# Patient Record
Sex: FEMALE | Race: AMERICAN INDIAN OR ALASKA NATIVE
[De-identification: names, ages, dates, MRNs, and addresses within clinical notes are randomized per-mention and may not be internally consistent; named-entity substitution may affect disease eponyms.]

---

## 2018-04-21 ENCOUNTER — HOSPITAL ENCOUNTER (EMERGENCY)
Dept: HOSPITAL 5 - ED | Age: 43
LOS: 5 days | Discharge: PSYCHIATRIC OR SUBSTANCE USE FACILITY | End: 2018-04-26
Payer: SELF-PAY

## 2018-04-21 DIAGNOSIS — Z79.899: ICD-10-CM

## 2018-04-21 DIAGNOSIS — F22: ICD-10-CM

## 2018-04-21 DIAGNOSIS — F17.200: ICD-10-CM

## 2018-04-21 DIAGNOSIS — Z98.51: ICD-10-CM

## 2018-04-21 DIAGNOSIS — F12.10: ICD-10-CM

## 2018-04-21 DIAGNOSIS — F29: Primary | ICD-10-CM

## 2018-04-21 LAB
ALBUMIN SERPL-MCNC: 4.4 G/DL (ref 3.9–5)
ALT SERPL-CCNC: 14 UNITS/L (ref 7–56)
BASOPHILS # (AUTO): 0.1 K/MM3 (ref 0–0.1)
BASOPHILS NFR BLD AUTO: 0.9 % (ref 0–1.8)
BENZODIAZEPINES SCREEN,URINE: (no result)
BILIRUB UR QL STRIP: (no result)
BLOOD UR QL VISUAL: (no result)
BUN SERPL-MCNC: 8 MG/DL (ref 7–17)
BUN/CREAT SERPL: 16 %
CALCIUM SERPL-MCNC: 9.4 MG/DL (ref 8.4–10.2)
EOSINOPHIL # BLD AUTO: 0.1 K/MM3 (ref 0–0.4)
EOSINOPHIL NFR BLD AUTO: 1.6 % (ref 0–4.3)
HCT VFR BLD CALC: 36.1 % (ref 30.3–42.9)
HEMOLYSIS INDEX: 11
HGB BLD-MCNC: 11.6 GM/DL (ref 10.1–14.3)
LYMPHOCYTES # BLD AUTO: 2.3 K/MM3 (ref 1.2–5.4)
LYMPHOCYTES NFR BLD AUTO: 25.4 % (ref 13.4–35)
MCH RBC QN AUTO: 27 PG (ref 28–32)
MCHC RBC AUTO-ENTMCNC: 32 % (ref 30–34)
MCV RBC AUTO: 83 FL (ref 79–97)
METHADONE SCREEN,URINE: (no result)
MONOCYTES # (AUTO): 0.5 K/MM3 (ref 0–0.8)
MONOCYTES % (AUTO): 5.4 % (ref 0–7.3)
MUCOUS THREADS #/AREA URNS HPF: (no result) /HPF
OPIATE SCREEN,URINE: (no result)
PH UR STRIP: 5 [PH] (ref 5–7)
PLATELET # BLD: 361 K/MM3 (ref 140–440)
PROT UR STRIP-MCNC: (no result) MG/DL
RBC # BLD AUTO: 4.35 M/MM3 (ref 3.65–5.03)
RBC #/AREA URNS HPF: 6 /HPF (ref 0–6)
UROBILINOGEN UR-MCNC: < 2 MG/DL (ref ?–2)
WBC #/AREA URNS HPF: < 1 /HPF (ref 0–6)

## 2018-04-21 PROCEDURE — 36415 COLL VENOUS BLD VENIPUNCTURE: CPT

## 2018-04-21 PROCEDURE — 80320 DRUG SCREEN QUANTALCOHOLS: CPT

## 2018-04-21 PROCEDURE — 84703 CHORIONIC GONADOTROPIN ASSAY: CPT

## 2018-04-21 PROCEDURE — 81001 URINALYSIS AUTO W/SCOPE: CPT

## 2018-04-21 PROCEDURE — 99285 EMERGENCY DEPT VISIT HI MDM: CPT

## 2018-04-21 PROCEDURE — 85025 COMPLETE CBC W/AUTO DIFF WBC: CPT

## 2018-04-21 PROCEDURE — 80053 COMPREHEN METABOLIC PANEL: CPT

## 2018-04-21 PROCEDURE — G0480 DRUG TEST DEF 1-7 CLASSES: HCPCS

## 2018-04-21 PROCEDURE — 80307 DRUG TEST PRSMV CHEM ANLYZR: CPT

## 2018-04-21 NOTE — EMERGENCY DEPARTMENT REPORT
ED Psych HPI





- General


Chief Complaint: Psych


Stated Complaint: MH/PSYCH


Time Seen by Provider: 04/21/18 17:12


Source: EMS


Mode of arrival: Stretcher





- History of Present Illness


Initial Comments: 





Family members for outpatient for psychiatric she's been having psychotic 

issues  hearing voices +paranoia and +delusions and hyper religiosity here for 

evaluation and possible si and unable to care for self 2/2 paranoid delusions, 

no feve rno cp no abd c/o, no med c/o, awake and alert nontoxic, admits hearing 

voices, denies drug use but +thc in uds


MD Complaint: feels depressed


-: unknown


Associated Psychiatric Symptoms: racing thoughts, auditory hallucinations, 

delusions


Quality: intermittent


Context: not taking psychiatric


Associated Symptoms: denies other symptoms, insomnia.  denies: confusion, 

headache, shortness of breath, nausea, vomiting, syncope


Treatments Prior to Arrival: placed on mental he





- Related Data


 Allergies











Allergy/AdvReac Type Severity Reaction Status Date / Time


 


No Known Allergies Allergy   Unverified 04/21/18 17:09














ED Review of Systems


ROS: 


Stated complaint: MH/PSYCH


Other details as noted in HPI





Comment: Unobtainable due to pts medical conditions


Constitutional: denies: diaphoresis, fever, malaise


Eyes: denies: eye discharge


ENT: denies: dental pain, hearing loss, epistaxis


Respiratory: denies: shortness of breath, SOB with exertion, SOB at rest, 

stridor


Cardiovascular: denies: chest pain, palpitations, dyspnea on exertion, orthopnea

, edema, syncope


Gastrointestinal: denies: abdominal pain, nausea, vomiting


Genitourinary: denies: frequency, hematuria, discharge


Neurological: denies: numbness, paresthesias, confusion


Psychiatric: depression, auditory hallucinations





ED Past Medical Hx





- Past Medical History


Additional medical history: DENIES MEDICAL HISTORY





- Surgical History


Additional Surgical History: TUBAL LIGATION





- Social History


Smoking Status: Current Every Day Smoker


Substance Use Type: None





ED Physical Exam





- General


Limitations: No Limitations


General appearance: alert, in no apparent distress, anxious





- Head


Head exam: Present: atraumatic, normocephalic





- Eye


Eye exam: Present: normal appearance, PERRL, EOMI





- ENT


ENT exam: Present: normal exam, normal orophraynx





- Neck


Neck exam: Present: normal inspection.  Absent: tenderness, meningismus





- Respiratory


Respiratory exam: Present: normal lung sounds bilaterally.  Absent: respiratory 

distress, wheezes, rales, rhonchi, stridor, chest wall tenderness, accessory 

muscle use, decreased breath sounds, prolonged expiratory





- Cardiovascular


Cardiovascular Exam: Present: regular rate, normal rhythm, normal heart sounds





- GI/Abdominal


GI/Abdominal exam: Present: soft.  Absent: tenderness, guarding, rebound, rigid

, mass, pulsatile mass





- Extremities Exam


Extremities exam: Present: normal inspection, normal capillary refill.  Absent: 

pedal edema, joint swelling, calf tenderness





- Back Exam


Back exam: Present: normal inspection.  Absent: CVA tenderness (L), muscle spasm

, paraspinal tenderness, vertebral tenderness





- Neurological Exam


Neurological exam: Present: alert, CN II-XII intact.  Absent: motor sensory 

deficit





- Psychiatric


Psychiatric exam: Present: depressed, agitated, anxious, manic





- Skin


Skin exam: Present: warm.  Absent: cyanosis, diaphoretic, erythema, urticaria, 

vesicles, petechiae, pallor, ecchymosis





ED Course


 Vital Signs











  04/21/18 04/21/18 04/21/18





  16:59 17:22 17:25


 


Temperature 99.0 F  98.4 F


 


Pulse Rate 87  94 H


 


Respiratory 16 16 16





Rate   


 


Blood Pressure 135/70  


 


Blood Pressure   111/55





[Left]   


 


O2 Sat by Pulse 99 100 





Oximetry   














ED Medical Decision Making





- Lab Data


Result diagrams: 


 04/21/18 17:27





 04/21/18 17:27





- Medical Decision Making





Patient placed on 1013 contusion nature of psychosis with paranoid delusions 

she is medically cleared for psychiatric evaluation, she is awake alert 

oriented but paranoid psychosis and we will need psychiatric mental health exam


Critical care attestation.: 


If time is entered above; I have spent that time in minutes in the direct care 

of this critically ill patient, excluding procedure time.








ED Disposition


Clinical Impression: 


 Psychosis, Paranoid delusion, Marijuana abuse





Disposition: DC/TX-[facility] PSY HOSP/PSY UNIT


Is pt being admited?: No


Condition: Stable


Time of Disposition: 18:37

## 2018-04-22 RX ADMIN — ZIPRASIDONE HYDROCHLORIDE SCH: 20 CAPSULE ORAL at 14:08

## 2018-04-22 RX ADMIN — ZIPRASIDONE HYDROCHLORIDE SCH MG: 20 CAPSULE ORAL at 22:26

## 2018-04-22 NOTE — CONSULTATION
History of Present Illness





- Reason for Consult


Consult date: 04/22/18


Reason for consult: Mental Health Evaluation


Requesting physician: MARGARITA ROMERO





- Chief Complaint


Chief complaint: 


"I don't know what is happening"








- History of Present Psychiatric Illness


43 y.o. AA female presenting to the ER for psychosis. Today the patient is calm

, cooperative, but delusional during the assessment. She stated going to party 

and the devil told her "something." Her description about what happened at this 

party was not logical. She stated not sleeping for several days prior to her 

admission. She stated that her energy level has been "heightened" lately. She 

stated feeling "weird mentally" the past couple days, but cannot explain why. 

She stated, "I need to get right." She denies SI/HI's and AVH's. She denies a 

poor appetite. She acknowledged smoking marijuana at the party, but denies 

alcohol consumption (etoh). 








Medications and Allergies


 Allergies











Allergy/AdvReac Type Severity Reaction Status Date / Time


 


No Known Allergies Allergy   Unverified 04/21/18 17:09











 Home Medications











 Medication  Instructions  Recorded  Confirmed  Last Taken  Type


 


No Known Home Medications [No  04/22/18 04/22/18 Unknown History





Reported Home Medications]     














Past psychiatric history





- Past Medical History


Past Medical History: No medical history


Past Surgical History: No surgical history





- past Psychiatric treatment and history


psychiatric treatment history: 


Denies a psy hx and fam psy hx.








- Social History


Social history: lives with family





Mental Status Exam





- Vital signs


 Last Vital Signs











Temp  99.1 F   04/22/18 08:07


 


Pulse  82   04/22/18 08:07


 


Resp  18   04/22/18 08:07


 


BP  104/56   04/22/18 08:07


 


Pulse Ox  100   04/22/18 08:07














- Exam


Narrative exam: 


MSE:





 Appearance: calm, cooperative


 Behavior: regular eye contact 


 Speech: regular rate and tone


 Mood: "okay"" 


 Affect: normal


 Thought Process: circumstantial 


 Thought Content: denies SI/HI's and AVH's, delusional, hyper Pentecostalism, 

paranoia


 Motor Activity: ambulatory


 Cognition: A/O x 3


 Insight: poor 


 Judgment: poor   








Results


Result Diagrams: 


 04/21/18 17:27





 04/21/18 17:27


 Abnormal lab results











  04/21/18 04/21/18 04/21/18 Range/Units





  17:27 17:27 17:27 


 


MCH  27 L    (28-32)  pg


 


Sodium   135 L   (137-145)  mmol/L


 


Creatinine   0.5 L   (0.7-1.2)  mg/dL


 


Glucose   109 H   ()  mg/dL


 


Salicylates     (2.8-20.0)  mg/dL


 


Acetaminophen    < 5.0 L  (10.0-30.0)  ug/mL














  04/21/18 Range/Units





  17:27 


 


MCH   (28-32)  pg


 


Sodium   (137-145)  mmol/L


 


Creatinine   (0.7-1.2)  mg/dL


 


Glucose   ()  mg/dL


 


Salicylates  < 0.3 L  (2.8-20.0)  mg/dL


 


Acetaminophen   (10.0-30.0)  ug/mL








All other labs normal.








Assessment and Plan


Assessment and plan: 


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 Today the 

patient is calm, cooperative, but delusional during the assessment. 





DDx: Bipolar DO, R/O Schizophrenia, R/O Substance Induced Mood 





Recommendation/Plan: Continue 1013 with placement to inpatient psy services. 

Start Geodon 20 mg PO BID for mood/psychosis. Discussed possible metabolic side 

effects of Geodon with patient.

## 2018-04-23 RX ADMIN — ZIPRASIDONE HYDROCHLORIDE SCH MG: 20 CAPSULE ORAL at 11:45

## 2018-04-23 RX ADMIN — ZIPRASIDONE HYDROCHLORIDE SCH MG: 20 CAPSULE ORAL at 22:16

## 2018-04-23 NOTE — PROGRESS NOTE
Subjective





- Reason for Consult


Consult date: 04/23/18


Reason for consult: Psychiatry Follow-up





- Chief Complaint


Chief complaint: 


"People are talking about me"





43 y.o. AA female presenting to the ER for psychosis. Today the patient is calm

, cooperative, but delusional during the assessment. She stated that people are 

talking about her in codes. She could not explain why she feel that way when 

asked. She stated these action by people have been going on for months and it's 

"disturbing." She is adamant that there's nothing wrong with her. She denies SI/

HI's and VH's. She cannot confirm or deny AH's. She denies any side effects of 

her medication. 








Mental Status Exam





- Vital signs


 Last Vital Signs











Temp  98.9 F   04/22/18 21:00


 


Pulse  74   04/22/18 21:00


 


Resp  20   04/22/18 21:00


 


BP  107/67   04/22/18 21:00


 


Pulse Ox  100   04/22/18 21:00














- Exam


Narrative exam: 


MSE:





 Appearance: calm, cooperative


 Behavior: regular eye contact 


 Speech: regular rate and tone


 Mood: "okay"" 


 Affect: normal


 Thought Process: circumstantial 


 Thought Content: denies SI/HI's and VH's, paranoia, delusional, cannot confirm 

or deny AH's


 Motor Activity: ambulatory


 Cognition: A/O x 3


 Insight: poor 


 Judgment: poor   








Assessment and Plan


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 Today the 

patient is calm, cooperative, but delusional during the assessment. 





DDx: Bipolar DO, R/O Schizophrenia, R/O Substance Induced Mood 





Recommendation/Plan: Continue 1013 with placement to inpatient psy services. 

Continue Geodon 20 mg PO BID for mood/psychosis. Discussed possible metabolic 

side effects of Geodon with patient.

## 2018-04-24 RX ADMIN — ZIPRASIDONE HYDROCHLORIDE SCH MG: 20 CAPSULE ORAL at 22:33

## 2018-04-24 RX ADMIN — ZIPRASIDONE HYDROCHLORIDE SCH MG: 20 CAPSULE ORAL at 13:45

## 2018-04-24 NOTE — PROGRESS NOTE
Subjective





- Reason for Consult


Reason for consult: delusional with AH





- Chief Complaint


Chief complaint: 








Subjectively:


Patient continues to report AVH.  At the current time patient is reporting 

sleep disturbance and racing thoughts as well.





General Appearance: hospital gown, disheveled


Sensorium/Consciousness: alert and responding to external stimuli; clear


Orientation:  person, place, time and situation


Eye Contact: limited


Attitude / Behavior: guarded


Psychomotor &


Musculoskeletal Activity: WNL


Mood: worried


Affect: constricted, limited range, anxious


Speech / Language: fluent, with normal rate/rhythm/tone


Thought Processes: perseverative


Thought Content: impoverished, somatically focused,  no SI/HI


Perception: +AH


Insight: limited


Judgement: limitied


Capacity for ADLs: independent





Plan:


Increase Geodon to 40 mg by mouth every 12 hours


Reassess tomorrow to see if 1013 needs to be continued





Mental Status Exam





- Vital signs


 Last Vital Signs











Temp  98.2 F   04/24/18 09:35


 


Pulse  76   04/24/18 09:35


 


Resp  18   04/24/18 09:35


 


BP  103/43   04/24/18 09:35


 


Pulse Ox  99   04/24/18 09:35

## 2018-04-25 RX ADMIN — ZIPRASIDONE HYDROCHLORIDE SCH MG: 20 CAPSULE ORAL at 22:06

## 2018-04-25 RX ADMIN — ZIPRASIDONE HYDROCHLORIDE SCH MG: 20 CAPSULE ORAL at 10:40

## 2018-04-25 NOTE — PROGRESS NOTE
Subjective





- Reason for Consult


Consult date: 04/25/18


Reason for consult: Psychiatric Follow-up Evaluation





- Chief Complaint


Chief complaint: 








43 y.o. AA female presenting to the ER for psychosis. Today the patient is calm

, cooperative, but delusional during the assessment. She stated that people are 

talking about her in codes. She could not explain why she feel that way when 

asked. She stated these action by people have been going on for months and it's 

"disturbing." She is adamant that there's nothing wrong with her. She denies SI/

HI's and VH's. She cannot confirm or deny AH's. She denies any side effects of 

her medication. 























Mental Status Exam





- Vital signs


 Last Vital Signs











Temp  98.5 F   04/25/18 09:00


 


Pulse  82   04/25/18 09:00


 


Resp  20   04/25/18 09:00


 


BP  110/60   04/25/18 09:00


 


Pulse Ox  100   04/25/18 09:00














- Exam


Narrative exam: 


Mental Status Exam:





General Appearance: hospital gown, disheveled


Sensorium/Consciousness: alert and responding to external stimuli; clear


Orientation:  person, place, time and situation


Eye Contact: limited


Attitude / Behavior: guarded


Psychomotor &


Musculoskeletal Activity: WNL


Mood: worried


Affect: constricted, limited range, anxious


Speech / Language: fluent, with normal rate/rhythm/tone


Thought Processes: perseverative


Thought Content: impoverished, somatically focused,  no SI/HI


Perception: +AH


Insight: limited


Judgement: limitied


Capacity for ADLs: independent








Assessment and Plan


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 Today the 

patient is calm, cooperative, but delusional during the assessment. 





DDx: Bipolar DO, R/O Schizophrenia, R/O Substance Induced Mood 





Plan:


1. Continue 1013 with placement to inpatient psychiatric services.


2. Continue Geodon to 40 mg by mouth every 12 hours. Discussed possible 

metabolic side effects of Geodon with patient. 


3. Reassess tomorrow to see if 1013 needs to be continued

## 2018-04-26 VITALS — DIASTOLIC BLOOD PRESSURE: 66 MMHG | SYSTOLIC BLOOD PRESSURE: 123 MMHG

## 2018-04-26 RX ADMIN — ZIPRASIDONE HYDROCHLORIDE SCH MG: 20 CAPSULE ORAL at 10:13

## 2018-04-26 NOTE — PROGRESS NOTE
Subjective





- Reason for Consult


Consult date: 04/26/18


Reason for consult: Psychiatry Follow-up





- Chief Complaint


Chief complaint: 


"Hello"





43 y.o. AA female presenting to the ER for psychosis. Today the patient is calm

, cooperative, but still delusional during the assessment. She stated that I 

should know what's going on with her. She stated that people are still talking 

about her in codes. She denies SI/HI's and AVH's She denies any side effects of 

her medication. 























Mental Status Exam





- Vital signs


 Last Vital Signs











Temp  98 F   04/26/18 10:08


 


Pulse  84   04/26/18 10:08


 


Resp  18   04/26/18 10:08


 


BP  123/66   04/26/18 10:08


 


Pulse Ox  100   04/26/18 10:08














- Exam


Narrative exam: 


MSE:





 Appearance: calm, cooperative


 Behavior: regular eye contact 


 Speech: regular rate and tone


 Mood: "okay"" 


 Affect: normal


 Thought Process: circumstantial 


 Thought Content: denies SI/HI's and AVH's, paranoia, delusional 


 Motor Activity: ambulatory


 Cognition: A/O x 3


 Insight: poor 


 Judgment: poor   




















Assessment and Plan


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 Today the 

patient is calm, cooperative, but delusional during the assessment. 





DDx: Bipolar DO, R/O Schizophrenia, R/O Substance Induced Mood 





Recommendation/Plan: Continue 1013 with placement to [facility] today. 

Continue Geodon 40 mg PO BID for mood/psychosis. Discussed possible metabolic 

side effects of Geodon with patient.